# Patient Record
Sex: MALE | Race: WHITE
[De-identification: names, ages, dates, MRNs, and addresses within clinical notes are randomized per-mention and may not be internally consistent; named-entity substitution may affect disease eponyms.]

---

## 2023-02-21 PROBLEM — Z00.00 ENCOUNTER FOR PREVENTIVE HEALTH EXAMINATION: Status: ACTIVE | Noted: 2023-02-21

## 2023-02-22 PROBLEM — Z85.01 HISTORY OF MALIGNANT NEOPLASM OF ESOPHAGUS: Status: RESOLVED | Noted: 2023-02-22 | Resolved: 2023-02-22

## 2023-02-22 PROBLEM — Z84.89 FAMILY HISTORY OF NEOPLASM OF BRAIN: Status: ACTIVE | Noted: 2023-02-22

## 2023-02-22 PROBLEM — Z80.3 FAMILY HISTORY OF MALIGNANT NEOPLASM OF BREAST: Status: ACTIVE | Noted: 2023-02-22

## 2023-02-22 PROBLEM — Z80.0 FAMILY HISTORY OF MALIGNANT NEOPLASM OF STOMACH: Status: ACTIVE | Noted: 2023-02-22

## 2023-02-22 PROBLEM — Z86.79 HISTORY OF CORONARY ARTERY DISEASE: Status: RESOLVED | Noted: 2023-02-22 | Resolved: 2023-02-22

## 2023-02-22 PROBLEM — Z80.0 FAMILY HISTORY OF MALIGNANT NEOPLASM OF COLON: Status: ACTIVE | Noted: 2023-02-22

## 2023-02-22 PROBLEM — Z86.79 HISTORY OF HYPERTENSION: Status: RESOLVED | Noted: 2023-02-22 | Resolved: 2023-02-22

## 2023-02-22 PROBLEM — Z80.1 FAMILY HISTORY OF LUNG CANCER: Status: ACTIVE | Noted: 2023-02-22

## 2023-02-22 PROBLEM — Z86.39 HISTORY OF HYPERLIPIDEMIA: Status: RESOLVED | Noted: 2023-02-22 | Resolved: 2023-02-22

## 2023-02-22 RX ORDER — HYDROCHLOROTHIAZIDE AND TRIAMTERENE 25; 37.5 MG/1; MG/1
CAPSULE ORAL
Refills: 0 | Status: ACTIVE | COMMUNITY

## 2023-02-22 RX ORDER — METOPROLOL TARTRATE 75 MG/1
TABLET, FILM COATED ORAL
Refills: 0 | Status: ACTIVE | COMMUNITY

## 2023-02-22 RX ORDER — ASPIRIN 81 MG
81 TABLET, DELAYED RELEASE (ENTERIC COATED) ORAL
Refills: 0 | Status: ACTIVE | COMMUNITY

## 2023-02-22 RX ORDER — DOXAZOSIN 8 MG/1
TABLET ORAL
Refills: 0 | Status: ACTIVE | COMMUNITY

## 2023-02-22 NOTE — HISTORY OF PRESENT ILLNESS
[FreeTextEntry1] : This is a 72 year old male diagnosed with prostate cancer in February 2023 referred by Dr Bailey. 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 Louisa Main. He is to undergo esophageal surgery next week. \par \par He first presented to Dr. Bailey with a PSA level of 4.32. PSA than steve to 4.8. PSA was noted to be 5.93._____\par \par Prostate MRI performed on 11/16/22 showed a 48.9 cc prostate. Lesion noted predominantly left (crossing into right) throughout transverse plane midgland (extending from base to apex) peripheral zone, measuring 25 x 14 mm. Overlying gross extraprostatic extension seen. PIRADS 5. \par Additional lesion noted right posterolateral base central zone, measuring 13 x 9 mm. Broadly abuts capsule without gross extra prostatic extension seen. PIRADS 4. \par No seminal vesicle invasion. No pathologic pelvic lymph nodes. No osseous lesions. Bilateral fat-containing inguinal hernias. \par \par Prostate biopsy performed on 2/9/23 showed adenocarcinoma in 6 of 12 cores. Gerda score of 3+4=7 in 5 cores and Gerda score of 4+3=7 in 1 core. Maximum core involvement 95%. \par \par He presents to discuss the role of radiation therapy in his care. \par

## 2023-02-22 NOTE — DISEASE MANAGEMENT
[Biopsy] : Patient had a biopsy on [7(4+3)] : Template Biopsy Elmer Score: 7(4+3) [] : Patient had a Prostate MRI [5] : 5 [Extracapsular Extension] : Extracapsular extension

## 2023-02-24 ENCOUNTER — APPOINTMENT (OUTPATIENT)
Dept: RADIATION ONCOLOGY | Facility: CLINIC | Age: 73
End: 2023-02-24
Payer: MEDICARE

## 2023-02-24 ENCOUNTER — NON-APPOINTMENT (OUTPATIENT)
Age: 73
End: 2023-02-24

## 2023-02-24 DIAGNOSIS — Z80.3 FAMILY HISTORY OF MALIGNANT NEOPLASM OF BREAST: ICD-10-CM

## 2023-02-24 DIAGNOSIS — Z84.89 FAMILY HISTORY OF OTHER SPECIFIED CONDITIONS: ICD-10-CM

## 2023-02-24 DIAGNOSIS — Z86.79 PERSONAL HISTORY OF OTHER DISEASES OF THE CIRCULATORY SYSTEM: ICD-10-CM

## 2023-02-24 DIAGNOSIS — Z80.0 FAMILY HISTORY OF MALIGNANT NEOPLASM OF DIGESTIVE ORGANS: ICD-10-CM

## 2023-02-24 DIAGNOSIS — Z86.39 PERSONAL HISTORY OF OTHER ENDOCRINE, NUTRITIONAL AND METABOLIC DISEASE: ICD-10-CM

## 2023-02-24 DIAGNOSIS — Z85.01 PERSONAL HISTORY OF MALIGNANT NEOPLASM OF ESOPHAGUS: ICD-10-CM

## 2023-02-24 DIAGNOSIS — Z80.1 FAMILY HISTORY OF MALIGNANT NEOPLASM OF TRACHEA, BRONCHUS AND LUNG: ICD-10-CM

## 2023-02-24 PROCEDURE — 99204 OFFICE O/P NEW MOD 45 MIN: CPT | Mod: 25

## 2023-02-24 RX ORDER — LEUPROLIDE ACETATE 22.5 MG/.375ML
22.5 INJECTION, SUSPENSION, EXTENDED RELEASE SUBCUTANEOUS
Qty: 1 | Refills: 1 | Status: ACTIVE | COMMUNITY
Start: 2023-02-24 | End: 1900-01-01

## 2023-02-24 RX ORDER — ROSUVASTATIN CALCIUM 5 MG/1
5 TABLET, FILM COATED ORAL
Refills: 0 | Status: ACTIVE | COMMUNITY

## 2023-02-24 RX ORDER — ROSUVASTATIN CALCIUM 5 MG/1
TABLET, FILM COATED ORAL
Refills: 0 | Status: DISCONTINUED | COMMUNITY
End: 2023-02-24

## 2023-02-24 NOTE — HISTORY OF PRESENT ILLNESS
[FreeTextEntry1] : This is a 72 year old male diagnosed with prostate cancer in February 2023 referred by Dr Bailey. 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 Louisa Main. He also follows with Dr. Gan, Dr. Ventura, and Dr. Maravilla. \par \par PSA on 3/3/21 was noted to be 4.32 ng/mL.\par PSA on 7/25/22 was noted to be 4.8 ng/mL.\par PSA on 11/16/22 was noted to be 5.93 ng/mL. \par \par He was noted to have a rising PSA and presented to Dr. Bailey.\par \par Prostate MRI performed on 11/16/22 showed a 48.9 cc prostate. Intravesical protrusion of 1.3 cm.  Lesion noted predominantly left (crossing into right) throughout transverse plane midgland (extending from base to apex) peripheral zone, measuring 25 x 14 mm. Overlying gross extraprostatic extension seen. PIRADS 5. \par Additional lesion noted right posterolateral base central zone, measuring 13 x 9 mm. Broadly abuts capsule without gross extra prostatic extension seen. PIRADS 4. \par No seminal vesicle invasion. No pathologic pelvic lymph nodes. No osseous lesions. Bilateral fat-containing inguinal hernias. \par \par Prostate biopsy performed on 2/9/23 showed adenocarcinoma in 6 of 12 cores. Harrah score of 3+4=7 in 5 cores and Gerda score of 4+3=7 in 1 core. Maximum core involvement 95%. \par \par Denies urinary symptoms. PSA drawn today. He presents to discuss the role of radiation therapy in his care. \par

## 2023-02-24 NOTE — VITALS
[Maximal Pain Intensity: 0/10] : 0/10 [90: Able to carry normal activity; minor signs or symptoms of disease.] : 90: Able to carry normal activity; minor signs or symptoms of disease.  [Date: ____________] : Patient's last distress assessment performed on [unfilled]. [1 - Distress Level] : Distress Level: 1

## 2023-02-24 NOTE — DISEASE MANAGEMENT
[3] : T3 [0] : M0 [0-10] : 0 -10 ng/mL [IIIB] : IIIB [BiopsyDate] : 02/23 [TotalCores] : 12 [TotalPositiveCores] : 6 [MaxCoreInvolvement] : 95

## 2023-02-24 NOTE — REVIEW OF SYSTEMS
[Hematuria: Grade 0] : Hematuria: Grade 0 [Urinary Incontinence: Grade 0] : Urinary Incontinence: Grade 0  [Urinary Retention: Grade 0] : Urinary Retention: Grade 0 [Urinary Tract Pain: Grade 0] : Urinary Tract Pain: Grade 0 [Urinary Urgency: Grade 0] : Urinary Urgency: Grade 0 [Urinary Frequency: Grade 0] : Urinary Frequency: Grade 0

## 2023-02-27 LAB — PSA SERPL-MCNC: 6.05 NG/ML

## 2023-06-01 ENCOUNTER — RESULT REVIEW (OUTPATIENT)
Age: 73
End: 2023-06-01

## 2023-06-16 ENCOUNTER — OUTPATIENT (OUTPATIENT)
Dept: OUTPATIENT SERVICES | Facility: HOSPITAL | Age: 73
LOS: 1 days | Discharge: HOME | End: 2023-06-16
Payer: MEDICARE

## 2023-06-20 ENCOUNTER — APPOINTMENT (OUTPATIENT)
Dept: MRI IMAGING | Facility: CLINIC | Age: 73
End: 2023-06-20
Payer: SELF-PAY

## 2023-06-20 ENCOUNTER — RESULT REVIEW (OUTPATIENT)
Age: 73
End: 2023-06-20

## 2023-06-20 ENCOUNTER — OUTPATIENT (OUTPATIENT)
Dept: OUTPATIENT SERVICES | Facility: HOSPITAL | Age: 73
LOS: 1 days | End: 2023-06-20
Payer: MEDICARE

## 2023-06-20 DIAGNOSIS — C61 MALIGNANT NEOPLASM OF PROSTATE: ICD-10-CM

## 2023-06-20 PROCEDURE — 76498 UNLISTED MR PROCEDURE: CPT | Mod: 26

## 2023-06-20 PROCEDURE — 72197 MRI PELVIS W/O & W/DYE: CPT | Mod: 26,MH

## 2023-06-20 PROCEDURE — A9585: CPT

## 2023-06-20 PROCEDURE — 76498 UNLISTED MR PROCEDURE: CPT

## 2023-06-20 PROCEDURE — 72197 MRI PELVIS W/O & W/DYE: CPT

## 2023-06-20 PROCEDURE — 76498P: CUSTOM | Mod: 26,MH

## 2023-07-05 ENCOUNTER — NON-APPOINTMENT (OUTPATIENT)
Age: 73
End: 2023-07-05

## 2023-07-06 ENCOUNTER — NON-APPOINTMENT (OUTPATIENT)
Age: 73
End: 2023-07-06

## 2023-07-06 DIAGNOSIS — C61 MALIGNANT NEOPLASM OF PROSTATE: ICD-10-CM

## 2023-07-06 PROCEDURE — 76872 US TRANSRECTAL: CPT | Mod: 26

## 2023-07-06 PROCEDURE — 55874 TPRNL PLMT BIODEGRDABL MATRL: CPT

## 2023-07-06 PROCEDURE — 55876 PLACE RT DEVICE/MARKER PROS: CPT

## 2023-07-06 RX ORDER — ONDANSETRON 4 MG/1
4 TABLET, ORALLY DISINTEGRATING ORAL
Qty: 0 | Refills: 0 | Status: COMPLETED | OUTPATIENT
Start: 2023-06-15

## 2023-07-06 NOTE — VITALS
[90: Able to carry normal activity; minor signs or symptoms of disease.] : 90: Able to carry normal activity; minor signs or symptoms of disease.  [ECOG Performance Status: 0 - Fully active, able to carry on all pre-disease performance without restriction] : Performance Status: 0 - Fully active, able to carry on all pre-disease performance without restriction

## 2023-07-06 NOTE — PROCEDURE
[FreeTextEntry1] : Transrectal ultrasound, transperineal placement of rectal spacer and gold fiducials [FreeTextEntry3] : Preoperative Diagnosis: Prostate cancer\par Postoperative Diagnosis: Prostate cancer\par Anesthesia: Local\par \par In preparation for the procedure, he self-administered an enema one hour before leaving home. He was prescribed a 3 days course of oral antibiotics twice daily to be started a day prior to the procedure. Topical EMLA cream was applied to the perineal area prior to procedure. Patient was administered Lorazepam 0.5 mg, Tylenol 650 mg, and Zofran 4 mg upon arrival in the department prior to procedure.\par \par Procedure risk and benefits were reviewed with patient and a written consent was obtained prior to procedure. A time out was observed with patient name, date of birth, procedure, position, and site verified.\par \par Patient was placed in a dorsal lithotomy position. Chloraprep was used to prep the skin. Less than 10 cc of Lidocaine 2% and sodium bicarbonate 8.4% was injected subcutaneously.\par \par While maintaining aseptic technique, an ultrasound probe was inserted into the rectum to visualize the prostate. A clear plane was visualized between the prostate and rectum, with no gross extracapsular extension present into the rectoprostatic space. \par \par Afterwards, 18 cc of Lidocaine and sodium bicarbonate was injected internally at the prostate apex and bilateral neurovascular bundles for the nerve block. Next, the hydrogel spacer kit was opened onto the sterile field and the hydrogel injection apparatus was prepared. Three gold fiducial markers were prepared on the sterile field. One fiducial marker was placed into each of the following sites: right base, right apex, and left mid, via transperineal needles under ultrasound guidance. A transperineal needle was positioned into the mid-line perirectal fat between the anterior rectal wall and prostate under ultrasound guidance. Less than 10 cc of saline was injected via the needle to hydrodissect the space and confirm proper placement in both axial and sagittal views. The syringe was aspirated to confirm the needle was extravascular. The syringe was replaced with the hydrogel injection apparatus and the gel was injected. The needle was then removed. There was minimal blood loss. The patient tolerated procedure well.\par \par Specimens: None\par Complications: None\par EBL: 5 cc \par \par Patient was transferred to the recovery area on a monitor. Vital signs were stable. He tolerated fluid and a snack by mouth and was made comfortable. He denied pain. Post procedure instruction were given and reviewed with patient. CT/SIM appointment was confirmed. He was discharged home in a stable condition, accompanied by his cousin/ [FreeTextEntry2] :  Adenocarcinoma of prostate (185) (C61); preparation for radiation therapy

## 2023-07-06 NOTE — REASON FOR VISIT
[Family Member] : family member [FreeTextEntry2] : Transrectal ultrasound, transperineal placement of rectal spacer and gold fiducials

## 2023-07-07 ENCOUNTER — NON-APPOINTMENT (OUTPATIENT)
Age: 73
End: 2023-07-07

## 2023-07-19 ENCOUNTER — NON-APPOINTMENT (OUTPATIENT)
Age: 73
End: 2023-07-19

## 2023-07-20 PROCEDURE — 77263 THER RADIOLOGY TX PLNG CPLX: CPT

## 2023-07-20 PROCEDURE — 77332 RADIATION TREATMENT AID(S): CPT

## 2023-07-26 PROCEDURE — 77300 RADIATION THERAPY DOSE PLAN: CPT

## 2023-07-26 PROCEDURE — 77338 DESIGN MLC DEVICE FOR IMRT: CPT

## 2023-07-26 PROCEDURE — 77301 RADIOTHERAPY DOSE PLAN IMRT: CPT

## 2023-08-14 PROCEDURE — 77427 RADIATION TX MANAGEMENT X5: CPT

## 2023-08-14 PROCEDURE — G6002: CPT

## 2023-08-14 PROCEDURE — G6015: CPT

## 2023-08-15 PROCEDURE — G6002: CPT

## 2023-08-15 PROCEDURE — G6015: CPT

## 2023-08-16 ENCOUNTER — NON-APPOINTMENT (OUTPATIENT)
Age: 73
End: 2023-08-16

## 2023-08-16 PROCEDURE — G6015: CPT

## 2023-08-16 PROCEDURE — G6002: CPT

## 2023-08-17 ENCOUNTER — NON-APPOINTMENT (OUTPATIENT)
Age: 73
End: 2023-08-17

## 2023-08-17 PROCEDURE — 77014: CPT

## 2023-08-17 PROCEDURE — G6015: CPT

## 2023-08-17 NOTE — DISEASE MANAGEMENT
[TTNM] : 3 [NTNM] : 0 [MTNM] : 0 [de-identified] : 644 [de-identified] : 4463 [de-identified] : Prostate/SV/Nodes

## 2023-08-17 NOTE — HISTORY OF PRESENT ILLNESS
[FreeTextEntry1] : This is a 73 year old male diagnosed with prostate cancer in February 2023 referred by Dr Bailey.   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 Louisa Main. He also follows with Dr. Gan, Dr. Ventura, and Dr. Maravilla.   PSA on 3/3/21 was noted to be 4.32 ng/mL. PSA on 7/25/22 was noted to be 4.8 ng/mL. PSA on 11/16/22 was noted to be 5.93 ng/mL.   He was noted to have a rising PSA and presented to Dr. Bailey.  Prostate MRI performed on 11/16/22 showed a 48.9 cc prostate. Intravesical protrusion of 1.3 cm.  Lesion noted predominantly left (crossing into right) throughout transverse plane midgland (extending from base to apex) peripheral zone, measuring 25 x 14 mm. Overlying gross extraprostatic extension seen. PIRADS 5.  Additional lesion noted right posterolateral base central zone, measuring 13 x 9 mm. Broadly abuts capsule without gross extra prostatic extension seen. PIRADS 4.  No seminal vesicle invasion. No pathologic pelvic lymph nodes. No osseous lesions. Bilateral fat-containing inguinal hernias.   Prostate biopsy performed on 2/9/23 showed adenocarcinoma in 6 of 12 cores. Jg score of 3+4=7 in 5 cores and Jg score of 4+3=7 in 1 core. Maximum core involvement 95%.   Denies urinary symptoms. PSA drawn today.   He was assessed to have prostate adenoca, stage IIIB, T3N0M0, jg 7 (4+3), PSA 5.93, with 6 of 12 biopsy cores positive, max core involvement 95%. MRI shows broad area of gross extraprostatic disease extension. Patient has high risk high volume disease. He is receiving ADT with Dr. Gan. Of note, he also has esophageal cancer, likely early stage, for which he underwent partial esophagectomy in November 2022, no adjuvant tx.  He is currently undergoing RT to the prostate/SV/Nodes.   He presents for and OTV 4/26 fx. Feeling well. Denies urinary symptoms. Only complaint is hot flashes from ADT with Dr. Gan.

## 2023-08-18 PROCEDURE — G6002: CPT

## 2023-08-18 PROCEDURE — 77336 RADIATION PHYSICS CONSULT: CPT

## 2023-08-18 PROCEDURE — G6015: CPT

## 2023-08-21 PROCEDURE — G6002: CPT

## 2023-08-21 PROCEDURE — 77427 RADIATION TX MANAGEMENT X5: CPT

## 2023-08-21 PROCEDURE — G6015: CPT

## 2023-08-22 PROCEDURE — G6015: CPT

## 2023-08-22 PROCEDURE — G6002: CPT

## 2023-08-23 VITALS — RESPIRATION RATE: 18 BRPM | OXYGEN SATURATION: 96 % | HEART RATE: 61 BPM | WEIGHT: 201 LBS

## 2023-08-23 PROCEDURE — G6002: CPT

## 2023-08-23 PROCEDURE — G6015: CPT

## 2023-08-23 NOTE — DISEASE MANAGEMENT
[Pathological] : TNM Stage: p [IIIB] : IIIB [TTNM] : 3 [NTNM] : 0 [MTNM] : 0 [[place] [id number]] : 2707 [de-identified] : 5205 [de-identified] : Prostate/SV/Nodes

## 2023-08-23 NOTE — HISTORY OF PRESENT ILLNESS
[FreeTextEntry1] : This is a 73 year old male diagnosed with prostate cancer in February 2023 referred by Dr Bailey.   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 Louisa Main. He also follows with Dr. Gan, Dr. Ventura, and Dr. Maravilla.   PSA on 3/3/21 was noted to be 4.32 ng/mL. PSA on 7/25/22 was noted to be 4.8 ng/mL. PSA on 11/16/22 was noted to be 5.93 ng/mL.   He was noted to have a rising PSA and presented to Dr. Bailey.  Prostate MRI performed on 11/16/22 showed a 48.9 cc prostate. Intravesical protrusion of 1.3 cm.  Lesion noted predominantly left (crossing into right) throughout transverse plane midgland (extending from base to apex) peripheral zone, measuring 25 x 14 mm. Overlying gross extraprostatic extension seen. PIRADS 5.  Additional lesion noted right posterolateral base central zone, measuring 13 x 9 mm. Broadly abuts capsule without gross extra prostatic extension seen. PIRADS 4.  No seminal vesicle invasion. No pathologic pelvic lymph nodes. No osseous lesions. Bilateral fat-containing inguinal hernias.   Prostate biopsy performed on 2/9/23 showed adenocarcinoma in 6 of 12 cores. Jg score of 3+4=7 in 5 cores and Jg score of 4+3=7 in 1 core. Maximum core involvement 95%.   Denies urinary symptoms. PSA drawn today.   He was assessed to have prostate adenoca, stage IIIB, T3N0M0, jg 7 (4+3), PSA 5.93, with 6 of 12 biopsy cores positive, max core involvement 95%. MRI shows broad area of gross extraprostatic disease extension. Patient has high risk high volume disease. He is receiving ADT with Dr. Gan. Of note, he also has esophageal cancer, likely early stage, for which he underwent partial esophagectomy in November 2022, no adjuvant tx.  He is currently undergoing RT to the prostate/SV/Nodes.   He presents for and OTV 9/26 fx. Feeling well.  No complaints.  Denies  or GI symptoms.

## 2023-08-24 ENCOUNTER — NON-APPOINTMENT (OUTPATIENT)
Age: 73
End: 2023-08-24

## 2023-08-24 PROCEDURE — G6015: CPT

## 2023-08-24 PROCEDURE — G6002: CPT

## 2023-08-25 PROCEDURE — 77336 RADIATION PHYSICS CONSULT: CPT

## 2023-08-25 PROCEDURE — G6002: CPT

## 2023-08-25 PROCEDURE — G6015: CPT

## 2023-08-27 ENCOUNTER — NON-APPOINTMENT (OUTPATIENT)
Age: 73
End: 2023-08-27

## 2023-08-28 VITALS — HEART RATE: 66 BPM | OXYGEN SATURATION: 98 % | RESPIRATION RATE: 18 BRPM | WEIGHT: 206 LBS

## 2023-08-28 PROCEDURE — 77427 RADIATION TX MANAGEMENT X5: CPT

## 2023-08-28 PROCEDURE — G6015: CPT

## 2023-08-28 PROCEDURE — G6002: CPT

## 2023-08-28 NOTE — DISEASE MANAGEMENT
[Pathological] : TNM Stage: p [IIIB] : IIIB [TTNM] : 3 [NTNM] : 0 [MTNM] : 0 [de-identified] : 6585 [de-identified] : 3906 [de-identified] : Prostate/SV/Nodes

## 2023-08-28 NOTE — HISTORY OF PRESENT ILLNESS
[FreeTextEntry1] : This is a 73 year old male diagnosed with prostate cancer in February 2023 referred by Dr Bailey.   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 Louisa Main. He also follows with Dr. Gan, Dr. Ventura, and Dr. Maravilla.   PSA on 3/3/21 was noted to be 4.32 ng/mL. PSA on 7/25/22 was noted to be 4.8 ng/mL. PSA on 11/16/22 was noted to be 5.93 ng/mL.   He was noted to have a rising PSA and presented to Dr. Bailey.  Prostate MRI performed on 11/16/22 showed a 48.9 cc prostate. Intravesical protrusion of 1.3 cm.  Lesion noted predominantly left (crossing into right) throughout transverse plane midgland (extending from base to apex) peripheral zone, measuring 25 x 14 mm. Overlying gross extraprostatic extension seen. PIRADS 5.  Additional lesion noted right posterolateral base central zone, measuring 13 x 9 mm. Broadly abuts capsule without gross extra prostatic extension seen. PIRADS 4.  No seminal vesicle invasion. No pathologic pelvic lymph nodes. No osseous lesions. Bilateral fat-containing inguinal hernias.   Prostate biopsy performed on 2/9/23 showed adenocarcinoma in 6 of 12 cores. Jg score of 3+4=7 in 5 cores and Jg score of 4+3=7 in 1 core. Maximum core involvement 95%.   Denies urinary symptoms. PSA drawn today.   He was assessed to have prostate adenoca, stage IIIB, T3N0M0, jg 7 (4+3), PSA 5.93, with 6 of 12 biopsy cores positive, max core involvement 95%. MRI shows broad area of gross extraprostatic disease extension. Patient has high risk high volume disease. He is receiving ADT with Dr. Gan. Of note, he also has esophageal cancer, likely early stage, for which he underwent partial esophagectomy in November 2022, no adjuvant tx.  He is currently undergoing RT to the prostate/SV/Nodes.   He presents for and OTV 11/26 fx. Feeling well.  No complaints.  Denies  or GI symptoms.

## 2023-08-29 PROCEDURE — G6002: CPT

## 2023-08-29 PROCEDURE — G6015: CPT

## 2023-08-30 PROCEDURE — G6015: CPT

## 2023-08-30 PROCEDURE — G6002: CPT

## 2023-08-31 PROCEDURE — 77014: CPT

## 2023-08-31 PROCEDURE — G6015: CPT

## 2023-09-01 PROCEDURE — 77336 RADIATION PHYSICS CONSULT: CPT

## 2023-09-01 PROCEDURE — G6015: CPT

## 2023-09-01 PROCEDURE — G6002: CPT

## 2023-09-05 ENCOUNTER — NON-APPOINTMENT (OUTPATIENT)
Age: 73
End: 2023-09-05

## 2023-09-05 PROCEDURE — G6015: CPT

## 2023-09-05 PROCEDURE — G6002: CPT

## 2023-09-05 PROCEDURE — 77427 RADIATION TX MANAGEMENT X5: CPT

## 2023-09-06 VITALS
OXYGEN SATURATION: 96 % | RESPIRATION RATE: 18 BRPM | HEART RATE: 53 BPM | WEIGHT: 199 LBS | DIASTOLIC BLOOD PRESSURE: 72 MMHG | SYSTOLIC BLOOD PRESSURE: 120 MMHG

## 2023-09-06 PROCEDURE — G6002: CPT

## 2023-09-06 PROCEDURE — G6015: CPT

## 2023-09-06 NOTE — HISTORY OF PRESENT ILLNESS
[FreeTextEntry1] : This is a 73 year old male diagnosed with prostate cancer in February 2023 referred by Dr Bailey.   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 Louisa Main. He also follows with Dr. Gan, Dr. Ventura, and Dr. Maravilla.   PSA on 3/3/21 was noted to be 4.32 ng/mL. PSA on 7/25/22 was noted to be 4.8 ng/mL. PSA on 11/16/22 was noted to be 5.93 ng/mL.   He was noted to have a rising PSA and presented to Dr. Bailey.  Prostate MRI performed on 11/16/22 showed a 48.9 cc prostate. Intravesical protrusion of 1.3 cm.  Lesion noted predominantly left (crossing into right) throughout transverse plane midgland (extending from base to apex) peripheral zone, measuring 25 x 14 mm. Overlying gross extraprostatic extension seen. PIRADS 5.  Additional lesion noted right posterolateral base central zone, measuring 13 x 9 mm. Broadly abuts capsule without gross extra prostatic extension seen. PIRADS 4.  No seminal vesicle invasion. No pathologic pelvic lymph nodes. No osseous lesions. Bilateral fat-containing inguinal hernias.   Prostate biopsy performed on 2/9/23 showed adenocarcinoma in 6 of 12 cores. Jg score of 3+4=7 in 5 cores and Jg score of 4+3=7 in 1 core. Maximum core involvement 95%.   Denies urinary symptoms. PSA drawn today.   He was assessed to have prostate adenoca, stage IIIB, T3N0M0, jg 7 (4+3), PSA 5.93, with 6 of 12 biopsy cores positive, max core involvement 95%. MRI shows broad area of gross extraprostatic disease extension. Patient has high risk high volume disease. He is receiving ADT with Dr. Gan. Of note, he also has esophageal cancer, likely early stage, for which he underwent partial esophagectomy in November 2022, no adjuvant tx.  He is currently undergoing RT to the prostate/SV/Nodes.   He presents for and OTV 17/26 fx. Feeling well.  No complaints.  Denies GI symptoms. Has urinary frequency and nocturia x 4, but states he was drinking espresso in the evening.  Had a hormone injection yesterday with Dr. Gan.

## 2023-09-06 NOTE — DISEASE MANAGEMENT
[Pathological] : TNM Stage: p [IIIB] : IIIB [TTNM] : 3 [NTNM] : 0 [MTNM] : 0 [de-identified] : 4452 [de-identified] : 4519 [de-identified] : Prostate/SV/Nodes

## 2023-09-06 NOTE — DISEASE MANAGEMENT
[TTNM] : 3 [NTNM] : 0 [MTNM] : 0 [de-identified] : 5973 [de-identified] : 5931 [de-identified] : Prostate/SV/Nodes

## 2023-09-06 NOTE — HISTORY OF PRESENT ILLNESS
[FreeTextEntry1] : This is a 73 year old male diagnosed with prostate cancer in February 2023 referred by Dr Bailey.   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 Louisa Main. He also follows with Dr. Gan, Dr. Ventura, and Dr. Maravilla.   PSA on 3/3/21 was noted to be 4.32 ng/mL. PSA on 7/25/22 was noted to be 4.8 ng/mL. PSA on 11/16/22 was noted to be 5.93 ng/mL.   He was noted to have a rising PSA and presented to Dr. Bailey.  Prostate MRI performed on 11/16/22 showed a 48.9 cc prostate. Intravesical protrusion of 1.3 cm.  Lesion noted predominantly left (crossing into right) throughout transverse plane midgland (extending from base to apex) peripheral zone, measuring 25 x 14 mm. Overlying gross extraprostatic extension seen. PIRADS 5.  Additional lesion noted right posterolateral base central zone, measuring 13 x 9 mm. Broadly abuts capsule without gross extra prostatic extension seen. PIRADS 4.  No seminal vesicle invasion. No pathologic pelvic lymph nodes. No osseous lesions. Bilateral fat-containing inguinal hernias.   Prostate biopsy performed on 2/9/23 showed adenocarcinoma in 6 of 12 cores. Jg score of 3+4=7 in 5 cores and Jg score of 4+3=7 in 1 core. Maximum core involvement 95%.   Denies urinary symptoms. PSA drawn today.   He was assessed to have prostate adenoca, stage IIIB, T3N0M0, jg 7 (4+3), PSA 5.93, with 6 of 12 biopsy cores positive, max core involvement 95%. MRI shows broad area of gross extraprostatic disease extension. Patient has high risk high volume disease. He is receiving ADT with Dr. Gan. Of note, he also has esophageal cancer, likely early stage, for which he underwent partial esophagectomy in November 2022, no adjuvant tx.  He is currently undergoing RT to the prostate/SV/Nodes.   He presents for and OTV 20/26 fx. Feeling well.  No complaints.  Denies  or GI symptoms. Had a hormone injection last week with Dr. Gan.

## 2023-09-07 ENCOUNTER — TRANSCRIPTION ENCOUNTER (OUTPATIENT)
Age: 73
End: 2023-09-07

## 2023-09-07 ENCOUNTER — NON-APPOINTMENT (OUTPATIENT)
Age: 73
End: 2023-09-07

## 2023-09-07 PROCEDURE — 77014: CPT

## 2023-09-07 PROCEDURE — G6015: CPT

## 2023-09-08 PROCEDURE — G6015: CPT

## 2023-09-08 PROCEDURE — G6002: CPT

## 2023-09-10 ENCOUNTER — NON-APPOINTMENT (OUTPATIENT)
Age: 73
End: 2023-09-10

## 2023-09-11 PROCEDURE — G6002: CPT

## 2023-09-11 PROCEDURE — G6015: CPT

## 2023-09-11 PROCEDURE — 77336 RADIATION PHYSICS CONSULT: CPT

## 2023-09-12 PROCEDURE — G6002: CPT

## 2023-09-12 PROCEDURE — 77427 RADIATION TX MANAGEMENT X5: CPT

## 2023-09-12 PROCEDURE — G6015: CPT

## 2023-09-13 PROCEDURE — G6002: CPT

## 2023-09-13 PROCEDURE — G6015: CPT

## 2023-09-14 PROCEDURE — 77014: CPT

## 2023-09-14 PROCEDURE — G6015: CPT

## 2023-09-15 PROCEDURE — G6002: CPT

## 2023-09-15 PROCEDURE — G6015: CPT

## 2023-09-18 ENCOUNTER — NON-APPOINTMENT (OUTPATIENT)
Age: 73
End: 2023-09-18

## 2023-09-18 VITALS
OXYGEN SATURATION: 96 % | RESPIRATION RATE: 18 BRPM | BODY MASS INDEX: 29.18 KG/M2 | HEART RATE: 67 BPM | WEIGHT: 197 LBS | HEIGHT: 69 IN

## 2023-09-18 PROCEDURE — G6015: CPT

## 2023-09-18 PROCEDURE — 77336 RADIATION PHYSICS CONSULT: CPT

## 2023-09-18 PROCEDURE — G6002: CPT

## 2023-09-19 PROCEDURE — G6002: CPT

## 2023-09-19 PROCEDURE — G6015: CPT

## 2023-10-19 ENCOUNTER — APPOINTMENT (OUTPATIENT)
Dept: RADIATION ONCOLOGY | Facility: CLINIC | Age: 73
End: 2023-10-19
Payer: MEDICARE

## 2023-10-19 ENCOUNTER — NON-APPOINTMENT (OUTPATIENT)
Age: 73
End: 2023-10-19

## 2023-10-19 VITALS
HEIGHT: 69 IN | SYSTOLIC BLOOD PRESSURE: 114 MMHG | DIASTOLIC BLOOD PRESSURE: 72 MMHG | HEART RATE: 52 BPM | WEIGHT: 202 LBS | RESPIRATION RATE: 18 BRPM | BODY MASS INDEX: 29.92 KG/M2 | OXYGEN SATURATION: 97 %

## 2023-10-19 PROCEDURE — 99024 POSTOP FOLLOW-UP VISIT: CPT

## 2023-10-19 RX ORDER — AMOXICILLIN AND CLAVULANATE POTASSIUM 875; 125 MG/1; MG/1
875-125 TABLET, COATED ORAL
Qty: 6 | Refills: 0 | Status: DISCONTINUED | COMMUNITY
Start: 2023-06-15 | End: 2023-10-19

## 2023-10-23 LAB — PSA SERPL-MCNC: 0.07 NG/ML

## 2024-01-12 ENCOUNTER — TRANSCRIPTION ENCOUNTER (OUTPATIENT)
Age: 74
End: 2024-01-12

## 2024-01-16 ENCOUNTER — TRANSCRIPTION ENCOUNTER (OUTPATIENT)
Age: 74
End: 2024-01-16

## 2024-01-16 RX ORDER — TAMSULOSIN HYDROCHLORIDE 0.4 MG/1
0.4 CAPSULE ORAL
Qty: 30 | Refills: 6 | Status: ACTIVE | COMMUNITY
Start: 2023-09-08 | End: 1900-01-01

## 2024-02-28 ENCOUNTER — NON-APPOINTMENT (OUTPATIENT)
Age: 74
End: 2024-02-28

## 2024-02-28 ENCOUNTER — APPOINTMENT (OUTPATIENT)
Dept: RADIATION ONCOLOGY | Facility: CLINIC | Age: 74
End: 2024-02-28
Payer: MEDICARE

## 2024-02-28 PROCEDURE — 99213 OFFICE O/P EST LOW 20 MIN: CPT

## 2024-02-28 NOTE — HISTORY OF PRESENT ILLNESS
[FreeTextEntry1] : This is a 73 year old male diagnosed with prostate cancer in February 2023 referred by Dr Bailey.   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 Louisa Main. He also follows with Dr. Gan, Dr. Ventura, and Dr. Maravilla.   PSA on 3/3/21 was noted to be 4.32 ng/mL. PSA on 7/25/22 was noted to be 4.8 ng/mL. PSA on 11/16/22 was noted to be 5.93 ng/mL.   He was noted to have a rising PSA and presented to Dr. Bailey.  Prostate MRI performed on 11/16/22 showed a 48.9 cc prostate. Intravesical protrusion of 1.3 cm.  Lesion noted predominantly left (crossing into right) throughout transverse plane midgland (extending from base to apex) peripheral zone, measuring 25 x 14 mm. Overlying gross extraprostatic extension seen. PIRADS 5.  Additional lesion noted right posterolateral base central zone, measuring 13 x 9 mm. Broadly abuts capsule without gross extra prostatic extension seen. PIRADS 4.  No seminal vesicle invasion. No pathologic pelvic lymph nodes. No osseous lesions. Bilateral fat-containing inguinal hernias.   Prostate biopsy performed on 2/9/23 showed adenocarcinoma in 6 of 12 cores. Jg score of 3+4=7 in 5 cores and Jg score of 4+3=7 in 1 core. Maximum core involvement 95%.   Denies urinary symptoms. PSA drawn today.   He was assessed to have prostate adenoca, stage IIIB, T3N0M0, jg 7 (4+3), PSA 5.93, with 6 of 12 biopsy cores positive, max core involvement 95%. MRI shows broad area of gross extraprostatic disease extension. Patient has high risk high volume disease. He is receiving ADT with Dr. Gan. Of note, he also has esophageal cancer, likely early stage, for which he underwent partial esophagectomy in November 2022, no adjuvant tx.  He completed RT to the prostate/SV/Nodes, total dose of 7020 cGy in 26 fx, on 9/19/23.   PSA on 10/19/23 was 0.07. PSA on 1/3/24 was 0.09 with Dr. Gan PSA on 2/1/24 was 0.022 with Dr. Gan.   He presents for a 5 month follow up. Feeling well. Nocturia noted. Taking Flomax. Denies hematuria or dysuria. Continues with ADT, most recent hormone injection was February 2024 with Dr. Gan, next injection tomorrow with PSA draw. Continues to follow with Dr. Bailey.

## 2024-02-28 NOTE — DISEASE MANAGEMENT
[BiopsyDate] : 02/23 [MeasuredProstateVolume] : 48.9 [TotalPositiveCores] : 6 [TotalCores] : 12 [MaxCoreInvolvement] : 95 [EBRTFractions] : 26 [EBRTDose] : 7066 [RadiationCompletedDate] : 09/23

## 2024-02-28 NOTE — REVIEW OF SYSTEMS
[Hematuria: Grade 0] : Hematuria: Grade 0 [Urinary Incontinence: Grade 0] : Urinary Incontinence: Grade 0  [Urinary Tract Pain: Grade 0] : Urinary Tract Pain: Grade 0 [Urinary Retention: Grade 0] : Urinary Retention: Grade 0 [Urinary Urgency: Grade 0] : Urinary Urgency: Grade 0 [Urinary Frequency: Grade 1 - Present] : Urinary Frequency: Grade 1 - Present

## 2024-02-29 ENCOUNTER — NON-APPOINTMENT (OUTPATIENT)
Age: 74
End: 2024-02-29

## 2024-08-12 NOTE — REASON FOR VISIT
[Routine Follow-Up] : a routine follow-up visit for prostate cancer [facility]  [address] [place] [address] [address]  [place] [address]  Phone: [phone]  Fax: [phone]  Follow Up Time: 1-3 days

## 2024-08-14 ENCOUNTER — APPOINTMENT (OUTPATIENT)
Dept: RADIATION ONCOLOGY | Facility: CLINIC | Age: 74
End: 2024-08-14
Payer: MEDICARE

## 2024-08-14 VITALS
SYSTOLIC BLOOD PRESSURE: 110 MMHG | WEIGHT: 197 LBS | HEIGHT: 69 IN | HEART RATE: 64 BPM | BODY MASS INDEX: 29.18 KG/M2 | OXYGEN SATURATION: 97 % | DIASTOLIC BLOOD PRESSURE: 62 MMHG | RESPIRATION RATE: 16 BRPM

## 2024-08-14 PROCEDURE — G2211 COMPLEX E/M VISIT ADD ON: CPT

## 2024-08-14 PROCEDURE — 99213 OFFICE O/P EST LOW 20 MIN: CPT

## 2024-08-14 NOTE — HISTORY OF PRESENT ILLNESS
[FreeTextEntry1] : This is a 73 year old male diagnosed with prostate cancer in February 2023 referred by Dr Bailey.   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 Louisa Main. He also follows with Dr. Gan, Dr. Ventura, and Dr. Maravilla.   PSA on 3/3/21 was noted to be 4.32 ng/mL. PSA on 7/25/22 was noted to be 4.8 ng/mL. PSA on 11/16/22 was noted to be 5.93 ng/mL.   He was noted to have a rising PSA and presented to Dr. Bailey.  Prostate MRI performed on 11/16/22 showed a 48.9 cc prostate. Intravesical protrusion of 1.3 cm.  Lesion noted predominantly left (crossing into right) throughout transverse plane midgland (extending from base to apex) peripheral zone, measuring 25 x 14 mm. Overlying gross extraprostatic extension seen. PIRADS 5.  Additional lesion noted right posterolateral base central zone, measuring 13 x 9 mm. Broadly abuts capsule without gross extra prostatic extension seen. PIRADS 4.  No seminal vesicle invasion. No pathologic pelvic lymph nodes. No osseous lesions. Bilateral fat-containing inguinal hernias.   Prostate biopsy performed on 2/9/23 showed adenocarcinoma in 6 of 12 cores. Gerda score of 3+4=7 in 5 cores and Gerda score of 4+3=7 in 1 core. Maximum core involvement 95%.   He was assessed to have prostate adenoca, stage IIIB, T3N0M0, Gerda 7 (4+3), PSA 5.93, with 6 of 12 biopsy cores positive, max core involvement 95%. MRI shows broad area of gross extraprostatic disease extension. Patient has high-risk high-volume disease. He is receiving ADT with Dr. Gan. Of note, he also has esophageal cancer, likely early stage, for which he underwent partial esophagectomy in November 2022, no adjuvant tx.  He completed RT to the prostate/SV/Nodes, total dose of 7020 cGy in 26 fx, on 9/19/23.   PSA Trend  PSA on 10/19/23 was 0.07. PSA on 1/3/24 was 0.09 with Dr. Gan PSA on 2/1/24 was 0.022 with Dr. Gan.  PSA on 7/18/24 was 0.014  8/14/2024 Patient presents for 11 months follow up. Feeling well overall. Taking Flomax. Denies hematuria or dysuria. Bowel movement regular. Continues ADT hormone injection with Dr. Gan; next injection on 8/16/24. Patient scheduled to see Dr. Bailey on 8/30/2024 for eval of varicocele.

## 2024-08-14 NOTE — DISEASE MANAGEMENT
[3] : T3 [0] : M0 [0-10] : 0 -10 ng/mL [7(4+3)] : Template Biopsy [place] Score: 7(4+3) [] : Patient had a Prostate MRI [5] : 5 [IIIB] : IIIB [Radiation Therapy] : Radiation Therapy [Treatment with radiation therapy] : Treatment with radiation therapy [BiopsyDate] : 02/23 [MeasuredProstateVolume] : 48.9 [TotalCores] : 12 [TotalPositiveCores] : 6 [MaxCoreInvolvement] : 95 [RadiationCompletedDate] : 09/23 [EBRTDose] : 7041 [EBRTFractions] : 26

## 2024-08-14 NOTE — DISEASE MANAGEMENT
[3] : T3 [0] : M0 [0-10] : 0 -10 ng/mL [7(4+3)] : Template Biopsy [place] Score: 7(4+3) [] : Patient had a Prostate MRI [5] : 5 [IIIB] : IIIB [Radiation Therapy] : Radiation Therapy [Treatment with radiation therapy] : Treatment with radiation therapy [BiopsyDate] : 02/23 [MeasuredProstateVolume] : 48.9 [TotalCores] : 12 [TotalPositiveCores] : 6 [MaxCoreInvolvement] : 95 [RadiationCompletedDate] : 09/23 [EBRTDose] : 7084 [EBRTFractions] : 26

## 2024-08-14 NOTE — REVIEW OF SYSTEMS
[Constipation: Grade 0] : Constipation: Grade 0 [Urinary Incontinence: Grade 0] : Urinary Incontinence: Grade 0  [Urinary Urgency: Grade 0] : Urinary Urgency: Grade 0 [Urinary Frequency: Grade 0] : Urinary Frequency: Grade 0

## 2025-02-12 ENCOUNTER — APPOINTMENT (OUTPATIENT)
Dept: RADIATION ONCOLOGY | Facility: CLINIC | Age: 75
End: 2025-02-12
Payer: MEDICARE

## 2025-02-12 VITALS
OXYGEN SATURATION: 97 % | RESPIRATION RATE: 16 BRPM | HEIGHT: 69 IN | HEART RATE: 55 BPM | BODY MASS INDEX: 30.21 KG/M2 | SYSTOLIC BLOOD PRESSURE: 144 MMHG | WEIGHT: 204 LBS | DIASTOLIC BLOOD PRESSURE: 78 MMHG

## 2025-02-12 PROCEDURE — G2211 COMPLEX E/M VISIT ADD ON: CPT

## 2025-02-12 PROCEDURE — 99213 OFFICE O/P EST LOW 20 MIN: CPT

## 2025-08-13 ENCOUNTER — APPOINTMENT (OUTPATIENT)
Dept: RADIATION ONCOLOGY | Facility: CLINIC | Age: 75
End: 2025-08-13
Payer: MEDICARE

## 2025-08-13 VITALS
SYSTOLIC BLOOD PRESSURE: 123 MMHG | HEART RATE: 52 BPM | DIASTOLIC BLOOD PRESSURE: 64 MMHG | HEIGHT: 69 IN | RESPIRATION RATE: 16 BRPM | OXYGEN SATURATION: 98 % | WEIGHT: 194 LBS | BODY MASS INDEX: 28.73 KG/M2

## 2025-08-13 PROCEDURE — G2211 COMPLEX E/M VISIT ADD ON: CPT

## 2025-08-13 PROCEDURE — 99213 OFFICE O/P EST LOW 20 MIN: CPT
